# Patient Record
Sex: FEMALE | Race: WHITE | NOT HISPANIC OR LATINO | URBAN - METROPOLITAN AREA
[De-identification: names, ages, dates, MRNs, and addresses within clinical notes are randomized per-mention and may not be internally consistent; named-entity substitution may affect disease eponyms.]

---

## 2021-05-10 ENCOUNTER — TELEPHONE ENCOUNTER (OUTPATIENT)
Dept: URBAN - METROPOLITAN AREA CLINIC 12 | Facility: CLINIC | Age: 51
End: 2021-05-10

## 2021-05-10 RX ORDER — POLYETHYLENE GLYCOL 3350, SODIUM SULFATE, SODIUM CHLORIDE, POTASSIUM CHLORIDE, ASCORBIC ACID, SODIUM ASCORBATE 140-9-5.2G
AS DIRECTED KIT ORAL
Qty: 1 BOX | Refills: 0 | OUTPATIENT
Start: 2021-05-10 | End: 2021-05-12

## 2021-06-18 ENCOUNTER — OFFICE VISIT (OUTPATIENT)
Dept: URBAN - METROPOLITAN AREA SURGERY CENTER 18 | Facility: SURGERY CENTER | Age: 51
End: 2021-06-18
Payer: COMMERCIAL

## 2021-06-18 DIAGNOSIS — Z12.11 COLON CANCER SCREENING: ICD-10-CM

## 2021-06-18 PROCEDURE — G8907 PT DOC NO EVENTS ON DISCHARG: HCPCS | Performed by: INTERNAL MEDICINE

## 2021-06-18 PROCEDURE — G0121 COLON CA SCRN NOT HI RSK IND: HCPCS | Performed by: INTERNAL MEDICINE

## 2025-06-16 ENCOUNTER — DASHBOARD ENCOUNTERS (OUTPATIENT)
Age: 55
End: 2025-06-16

## 2025-06-16 ENCOUNTER — OFFICE VISIT (OUTPATIENT)
Dept: URBAN - METROPOLITAN AREA CLINIC 111 | Facility: CLINIC | Age: 55
End: 2025-06-16
Payer: COMMERCIAL

## 2025-06-16 DIAGNOSIS — K76.89 LIVER LESION: ICD-10-CM

## 2025-06-16 DIAGNOSIS — K76.0 HEPATIC STEATOSIS: ICD-10-CM

## 2025-06-16 PROCEDURE — 99204 OFFICE O/P NEW MOD 45 MIN: CPT | Performed by: PHYSICIAN ASSISTANT

## 2025-06-16 PROCEDURE — 99244 OFF/OP CNSLTJ NEW/EST MOD 40: CPT | Performed by: PHYSICIAN ASSISTANT

## 2025-06-16 NOTE — HPI-TODAY'S VISIT:
54 y/o female here to discuss MRI. This patient was referred by Dr. Sirisha Recinos. A copy of this will be sent to the referring provider. MRI on 5/22/25 revealing enhancing lesion in segment Cammie that is 1.4 cm. It is noted this is indeterminate but may represent a benign lesion such as FNH or an adenoma. Repeat MRI recommended in 6 months. Hepatic steatosis also noted. She had an abnormal U/S initially. Labs from last month reviewed: liver numbers WNL. S/p colon in 6/2021 by Dr. Beasley that was normal other than small non-bleeding hemorrhoids.   She sees hem/oncology for high platelet count. She is adopted and does not know FH. Fatty liver is a new diagnosis. She had U/S to look at her spleen. She has been vaccinated for Hep B. Not sure if she ever got tested for Hep C. She drinks alcohol once a week and reports she has cut down. She has had elevated liver numbers before with PCP. Her adoptive mother had liver cancer. Pt had melanoma this year on her left arm which was removed. She has had abdominal discomfort for a while recently but it has improved. MRI revealed normal gallbladder. She has had bloating after eating fatty foods.

## 2025-06-18 ENCOUNTER — TELEPHONE ENCOUNTER (OUTPATIENT)
Dept: URBAN - METROPOLITAN AREA CLINIC 111 | Facility: CLINIC | Age: 55
End: 2025-06-18

## 2025-06-18 ENCOUNTER — TELEPHONE ENCOUNTER (OUTPATIENT)
Dept: URBAN - METROPOLITAN AREA CLINIC 23 | Facility: CLINIC | Age: 55
End: 2025-06-18

## 2025-06-18 LAB — AFP, SERUM, TUMOR MARKER: 2.6

## 2025-06-20 ENCOUNTER — LAB OUTSIDE AN ENCOUNTER (OUTPATIENT)
Dept: URBAN - METROPOLITAN AREA CLINIC 23 | Facility: CLINIC | Age: 55
End: 2025-06-20

## 2025-06-23 LAB — HEPATITIS C ANTIBODY (REFL): (no result)

## 2025-08-28 ENCOUNTER — LAB OUTSIDE AN ENCOUNTER (OUTPATIENT)
Dept: URBAN - METROPOLITAN AREA CLINIC 23 | Facility: CLINIC | Age: 55
End: 2025-08-28